# Patient Record
Sex: FEMALE | URBAN - METROPOLITAN AREA
[De-identification: names, ages, dates, MRNs, and addresses within clinical notes are randomized per-mention and may not be internally consistent; named-entity substitution may affect disease eponyms.]

---

## 2019-11-09 ENCOUNTER — HOSPITAL ENCOUNTER (EMERGENCY)
Facility: HOSPITAL | Age: 59
Discharge: HOME | End: 2019-11-09
Attending: EMERGENCY MEDICINE

## 2019-11-09 VITALS
DIASTOLIC BLOOD PRESSURE: 96 MMHG | RESPIRATION RATE: 18 BRPM | OXYGEN SATURATION: 99 % | BODY MASS INDEX: 21.35 KG/M2 | HEART RATE: 90 BPM | WEIGHT: 116 LBS | SYSTOLIC BLOOD PRESSURE: 149 MMHG | TEMPERATURE: 98 F | HEIGHT: 62 IN

## 2019-11-09 DIAGNOSIS — R52 PAIN: ICD-10-CM

## 2019-11-09 DIAGNOSIS — M25.572 CHRONIC ANKLE PAIN, BILATERAL: Primary | ICD-10-CM

## 2019-11-09 DIAGNOSIS — M25.571 CHRONIC ANKLE PAIN, BILATERAL: Primary | ICD-10-CM

## 2019-11-09 DIAGNOSIS — G89.29 CHRONIC ANKLE PAIN, BILATERAL: Primary | ICD-10-CM

## 2019-11-09 PROCEDURE — 99283 EMERGENCY DEPT VISIT LOW MDM: CPT | Mod: 25

## 2019-11-09 NOTE — ED PROVIDER NOTES
Encounter Date: 11/9/2019       History     Chief Complaint   Patient presents with    Ankle Pain     59-year-old female, states she is visiting from [place], working out of town and has a history of severe degenerative arthritic processes.  Complains of chronic bilateral ankle pain. She states she did fall 2 days ago, she does have an abrasion to her left elbow but denies or does not recall any specific injury to her ankles.  Only that they hurt worse than her normal arthritic pain.  Patient takes morphine for chronic pain.        Review of patient's allergies indicates:   Allergen Reactions    Aspirin Nausea And Vomiting    Vicodin [hydrocodone-acetaminophen] Nausea And Vomiting     No past medical history on file.  No past surgical history on file.  No family history on file.  Social History     Tobacco Use    Smoking status: Not on file   Substance Use Topics    Alcohol use: Not on file    Drug use: Not on file     Review of Systems   Constitutional: Negative for chills and fever.   HENT: Negative for congestion, rhinorrhea and sore throat.    Eyes: Negative for discharge and redness.   Respiratory: Negative for cough and shortness of breath.    Cardiovascular: Negative for chest pain.   Gastrointestinal: Negative for abdominal pain.   Musculoskeletal: Positive for arthralgias. Negative for back pain and joint swelling.   Skin: Positive for wound. Negative for rash.   Neurological: Negative for weakness.   Psychiatric/Behavioral: The patient is not nervous/anxious.    All other systems reviewed and are negative.      Physical Exam     Initial Vitals [11/09/19 1320]   BP Pulse Resp Temp SpO2   (!) 149/96 85 20 98.3 °F (36.8 °C) 99 %      MAP       --         Physical Exam    Nursing note and vitals reviewed.  Constitutional: She appears well-developed and well-nourished.   HENT:   Head: Normocephalic and atraumatic.   Eyes: EOM are normal. Pupils are equal, round, and reactive to light.   Neck: Normal range  of motion.   Pulmonary/Chest: No respiratory distress.   Musculoskeletal:        Left elbow: She exhibits normal range of motion, no swelling and no deformity.        Right ankle: She exhibits decreased range of motion. She exhibits no swelling and no deformity.        Left ankle: She exhibits decreased range of motion. She exhibits no swelling and no deformity.        Arms:  Neurological: She is alert and oriented to person, place, and time.   Skin: Skin is warm and dry.   Psychiatric: She has a normal mood and affect. Her behavior is normal.         ED Course   Procedures  Labs Reviewed - No data to display       Imaging Results    None       X-Rays:   Independently Interpreted Readings:   Other Readings:  Normal    Medical Decision Making:   History:   Old Medical Records: I decided to obtain old medical records.  Old Records Summarized: other records.       <> Summary of Records: Prescription monitoring program, patient takes morphine regularly, 50 mg tabs daily.  Last filled on September 21st  Initial Assessment:   NAD  Differential Diagnosis:   The patient's differential diagnoses includes but is not limited to arthritis, musculoskeletal pain,  Clinical Tests:   Radiological Study: Ordered and Reviewed  ED Management:  Remote history of fall with no acute injury to patient's related complaints today.  Bilateral ankle pain, chronic.  No trauma.  Normal exam.  Patient on chronic pain management, routinely takes morphine sulfate.  Last given a 30 day prescription on September 21st meaning that at this time she would be out of her medication.  Other:   I have discussed this case with another health care provider.       <> Summary of the Discussion: The patient's emergency department presentation, clinical course, pertinent findings of the physical exam as well as workup were discussed with the attending physician.  Plan of care was reviewed.                                 Clinical Impression:       ICD-10-CM  ICD-9-CM   1. Chronic ankle pain, bilateral M25.571 719.47    G89.29 338.29    M25.572    2. Pain R52 780.96                             TORI Honeycutt  11/09/19 1410

## 2019-11-09 NOTE — DISCHARGE INSTRUCTIONS
You must follow up with your routine medical providers for refill of pain management, chronic pain medications.